# Patient Record
Sex: MALE | Race: WHITE | HISPANIC OR LATINO | Employment: FULL TIME | URBAN - METROPOLITAN AREA
[De-identification: names, ages, dates, MRNs, and addresses within clinical notes are randomized per-mention and may not be internally consistent; named-entity substitution may affect disease eponyms.]

---
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
## 2020-03-17 ENCOUNTER — OFFICE VISIT (OUTPATIENT)
Dept: PRIMARY CARE CLINIC | Facility: CLINIC | Age: 20
End: 2020-03-17
Payer: COMMERCIAL

## 2020-03-17 ENCOUNTER — PATIENT MESSAGE (OUTPATIENT)
Dept: PRIMARY CARE CLINIC | Facility: CLINIC | Age: 20
End: 2020-03-17

## 2020-03-17 DIAGNOSIS — R68.89 FLU-LIKE SYMPTOMS: Primary | ICD-10-CM

## 2020-03-17 DIAGNOSIS — R68.89 FLU-LIKE SYMPTOMS: ICD-10-CM

## 2020-03-17 DIAGNOSIS — J06.9 UPPER RESPIRATORY TRACT INFECTION, UNSPECIFIED TYPE: Primary | ICD-10-CM

## 2020-03-17 PROCEDURE — 99203 OFFICE O/P NEW LOW 30 MIN: CPT | Mod: 95,,, | Performed by: FAMILY MEDICINE

## 2020-03-17 PROCEDURE — 99203 PR OFFICE/OUTPT VISIT, NEW, LEVL III, 30-44 MIN: ICD-10-PCS | Mod: 95,,, | Performed by: FAMILY MEDICINE

## 2020-03-17 RX ORDER — GUAIFENESIN/DEXTROMETHORPHAN 100-10MG/5
5 SYRUP ORAL EVERY 6 HOURS
Qty: 236 ML | Refills: 0 | COMMUNITY
Start: 2020-03-17 | End: 2020-03-27

## 2020-03-17 NOTE — PROGRESS NOTES
Subjective:       Patient ID: Levar Bro is a 19 y.o. male.    Chief Complaint: No chief complaint on file.    The patient location is: home  The chief complaint leading to consultation is: flu like symptoms  Visit type: Virtual visit with synchronous audio and video  Total time spent with patient: 15 min  Each patient to whom he or she provides medical services by telemedicine is:  (1) informed of the relationship between the physician and patient and the respective role of any other health care provider with respect to management of the patient; and (2) notified that he or she may decline to receive medical services by telemedicine and may withdraw from such care at any time.    Notes: as below      19 yr old with no sig past med hx complaining of cough, sore throat, fever, chills for the past 4 days. His sore throat has mostly resolved but chills and subjective fever off and on still. Symptoms started 4 days ago. No known sick contacts. Lives with parents and works at [employer].          Review of Systems   Constitutional: Positive for activity change, chills and fever.   HENT: Positive for rhinorrhea and sore throat. Negative for congestion.    Respiratory: Positive for cough. Negative for shortness of breath and wheezing.    Cardiovascular: Negative for chest pain.   Gastrointestinal: Negative for abdominal distention and abdominal pain.   Neurological: Negative for seizures.   Psychiatric/Behavioral: Negative for agitation. The patient is not nervous/anxious.        Objective:      There were no vitals filed for this visit.  Physical Exam   Constitutional: He is oriented to person, place, and time. He appears well-developed and well-nourished. He appears ill. No distress.   HENT:   Head: Normocephalic.   Eyes: EOM are normal.   Pulmonary/Chest: Effort normal. No respiratory distress.   Neurological: He is alert and oriented to person, place, and time.   Psychiatric: He has a normal mood and affect.            No results found for: NA, K, CL, CO2, BUN, CREATININE, GLUCOSE, ANIONGAP  No results found for: HGBA1C  No results found for: BNP, BNPTRIAGEBLO    No results found for: WBC, HGB, HCT, PLT, GRAN  No results found for: CHOL, HDL, LDLCALC, TRIG     No current outpatient medications on file.        Assessment:       1. Flu-like symptoms           Plan:       Flu-like symptoms  Flu like symptoms for the past 4 days with on known sick contacts. Encouraged self quarantine, hydration and rest. No SOB. To keep writer updated on symptoms. Would defer return to work with symptoms resolved >24 hours at least. Does not meet criteria for corona virus testing. Sending note through portal for employer.

## 2021-04-16 ENCOUNTER — PATIENT MESSAGE (OUTPATIENT)
Dept: RESEARCH | Facility: HOSPITAL | Age: 21
End: 2021-04-16

## 2022-03-23 ENCOUNTER — OFFICE VISIT (OUTPATIENT)
Dept: PRIMARY CARE CLINIC | Facility: CLINIC | Age: 22
End: 2022-03-23
Payer: MEDICAID

## 2022-03-23 VITALS
SYSTOLIC BLOOD PRESSURE: 136 MMHG | DIASTOLIC BLOOD PRESSURE: 78 MMHG | HEART RATE: 68 BPM | WEIGHT: 150.69 LBS | HEIGHT: 68 IN | OXYGEN SATURATION: 98 % | RESPIRATION RATE: 18 BRPM | BODY MASS INDEX: 22.84 KG/M2

## 2022-03-23 DIAGNOSIS — Z11.4 ENCOUNTER FOR SCREENING FOR HIV: ICD-10-CM

## 2022-03-23 DIAGNOSIS — F41.9 ANXIETY: ICD-10-CM

## 2022-03-23 DIAGNOSIS — Z23 NEED FOR TD VACCINE: ICD-10-CM

## 2022-03-23 DIAGNOSIS — Z11.59 NEED FOR HEPATITIS C SCREENING TEST: ICD-10-CM

## 2022-03-23 DIAGNOSIS — Z13.220 SCREENING CHOLESTEROL LEVEL: ICD-10-CM

## 2022-03-23 DIAGNOSIS — R07.89 ATYPICAL CHEST PAIN: ICD-10-CM

## 2022-03-23 DIAGNOSIS — R07.9 CHEST PAIN, UNSPECIFIED TYPE: Primary | ICD-10-CM

## 2022-03-23 PROCEDURE — 93010 ELECTROCARDIOGRAM REPORT: CPT | Mod: S$PBB,,, | Performed by: INTERNAL MEDICINE

## 2022-03-23 PROCEDURE — 3078F PR MOST RECENT DIASTOLIC BLOOD PRESSURE < 80 MM HG: ICD-10-PCS | Mod: CPTII,,, | Performed by: FAMILY MEDICINE

## 2022-03-23 PROCEDURE — 93010 EKG 12-LEAD: ICD-10-PCS | Mod: S$PBB,,, | Performed by: INTERNAL MEDICINE

## 2022-03-23 PROCEDURE — 99999 PR PBB SHADOW E&M-EST. PATIENT-LVL III: CPT | Mod: PBBFAC,,, | Performed by: FAMILY MEDICINE

## 2022-03-23 PROCEDURE — 3075F SYST BP GE 130 - 139MM HG: CPT | Mod: CPTII,,, | Performed by: FAMILY MEDICINE

## 2022-03-23 PROCEDURE — 99999 PR PBB SHADOW E&M-EST. PATIENT-LVL III: ICD-10-PCS | Mod: PBBFAC,,, | Performed by: FAMILY MEDICINE

## 2022-03-23 PROCEDURE — 90714 TD VACC NO PRESV 7 YRS+ IM: CPT | Mod: PBBFAC,PN

## 2022-03-23 PROCEDURE — 99395 PREV VISIT EST AGE 18-39: CPT | Mod: S$PBB,,, | Performed by: FAMILY MEDICINE

## 2022-03-23 PROCEDURE — 3008F PR BODY MASS INDEX (BMI) DOCUMENTED: ICD-10-PCS | Mod: CPTII,,, | Performed by: FAMILY MEDICINE

## 2022-03-23 PROCEDURE — 99395 PR PREVENTIVE VISIT,EST,18-39: ICD-10-PCS | Mod: S$PBB,,, | Performed by: FAMILY MEDICINE

## 2022-03-23 PROCEDURE — 93005 ELECTROCARDIOGRAM TRACING: CPT | Mod: PBBFAC,PN | Performed by: INTERNAL MEDICINE

## 2022-03-23 PROCEDURE — 3075F PR MOST RECENT SYSTOLIC BLOOD PRESS GE 130-139MM HG: ICD-10-PCS | Mod: CPTII,,, | Performed by: FAMILY MEDICINE

## 2022-03-23 PROCEDURE — 99213 OFFICE O/P EST LOW 20 MIN: CPT | Mod: PBBFAC,PN | Performed by: FAMILY MEDICINE

## 2022-03-23 PROCEDURE — 3078F DIAST BP <80 MM HG: CPT | Mod: CPTII,,, | Performed by: FAMILY MEDICINE

## 2022-03-23 PROCEDURE — 1159F PR MEDICATION LIST DOCUMENTED IN MEDICAL RECORD: ICD-10-PCS | Mod: CPTII,,, | Performed by: FAMILY MEDICINE

## 2022-03-23 PROCEDURE — 1159F MED LIST DOCD IN RCRD: CPT | Mod: CPTII,,, | Performed by: FAMILY MEDICINE

## 2022-03-23 PROCEDURE — 3008F BODY MASS INDEX DOCD: CPT | Mod: CPTII,,, | Performed by: FAMILY MEDICINE

## 2022-03-23 RX ORDER — CITALOPRAM 10 MG/1
10 TABLET ORAL DAILY
Qty: 30 TABLET | Refills: 11 | Status: SHIPPED | OUTPATIENT
Start: 2022-03-23 | End: 2023-03-23

## 2022-03-23 NOTE — PROGRESS NOTES
Subjective:       Patient ID: Levar Bro is a 21 y.o. male.    Chief Complaint: No chief complaint on file.    HPI:  21-year-old white male in for chest pain for 2 weeks --started between shoulder and chest---started working its way medially and then went away--only happened when patient stretch doubt really bad.  Has and felt it anymore last few day.  Had no trauma no excessive activity--no lupus rheumatoid gout.  Did have some discomfort with moving the left arm--does work out --pain worse than usual muscle--father with heart problems started age 52 --had stent .       No ankle edema palpitations MI heart murmur hypertension or hyperlipidemia--no stent bypass. --CT chest pain above      Last week patient had some diarrhea--no nausea vomiting constipation ulcers hepatitis gallbladder--no melena hematochezia hematemesis      + anxiety and +depression----father just  last 2021--diagnosed with ALS and he  .  After seen in the office told disease advanced too much and father was placed hospital--in school--just quit job 2 weeks ago --due stress. Mother stresses pt out. Has girlfriend but doing--mother with hypertension.     ROS:  Skin: no psoriasis, eczema, skin cancer  HEENT: No headache, ocular pain, blurred vision, diplopia, epistaxis, hoarseness change in voice, thyroid trouble  Lung: No pneumonia, asthma, Tb, wheezing, SOB,  Heart: + chest pain, no  ankle chandni no smoking a, palpitations, MI, jillian murmur, hypertension, hyperlipidemia  Abdomen: No nausea, vomiting,+ hx  diarrhea, constipation, ulcers, hepatitis, gallbladder disease, melena, hematochezia, hematemesis  : no UTI, renal disease, stones  MS: no fractures, O/A, lupus, rheumatoid, gout  Neuro: No dizziness, LOC, seizures   No diabetes, no anemia, + anxiety, + depression   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 Chest pain, unspecified type    2. Screening cholesterol level    3. Encounter for screening for HIV    4. Need for hepatitis C screening test    5. Need for Td vaccine    6. Anxiety    7. Atypical chest pain        Plan:       Chest pain, unspecified type  -     IN OFFICE EKG 12-LEAD (to [facility])    Screening cholesterol level  -     Lipid Panel; Future; Expected date: 2022    Encounter for screening for HIV  -     HIV 1/2 Ag/Ab (4th Gen); Future; Expected date: 2022    Need for hepatitis C screening test  -     Hepatitis C Antibody; Future; Expected date: 2022    Need for Td vaccine  -     (In Office Administered) Td Vaccine - Preservative Free    Anxiety  -     Ambulatory referral/consult to Psychology; Future; Expected date: 2022  -     CBC Auto Differential; Future; Expected date: 2022  -     Comprehensive Metabolic Panel; Future; Expected date: 2022  -     TSH; Future; Expected date: 2022    Atypical chest pain    Other orders  -     citalopram (CELEXA) 10 MG tablet; Take 1 tablet (10 mg total) by mouth once daily.  Dispense: 30 tablet; Refill: 11        Main Reason for Visit  Atypical chest pain--left shoulder and left lateral breast area--only for few days none the last few day--EKG normal--patient concern father had heart issues--lots of stress  Anxiety--father  2021 ALS--when discovered with so for long patient's dad was just placed in hospice--patient lives with mother has some issues--in school studying  computer--has girlfriend--will treat with Celexa 10 q.d. help with anxiety--may have to add lorazepam if needed but would be better for patient to see psychologist--develop some coping skills--patient should exercise--take yoga class--go to [employer]  Told to make a Wish bucket-make a scrap book

## 2022-03-24 ENCOUNTER — TELEPHONE (OUTPATIENT)
Dept: PSYCHOLOGY | Facility: CLINIC | Age: 22
End: 2022-03-24
Payer: MEDICAID

## 2022-03-24 ENCOUNTER — TELEPHONE (OUTPATIENT)
Dept: DIABETES | Facility: CLINIC | Age: 22
End: 2022-03-24
Payer: MEDICAID

## 2022-03-24 ENCOUNTER — PATIENT MESSAGE (OUTPATIENT)
Dept: DIABETES | Facility: CLINIC | Age: 22
End: 2022-03-24
Payer: MEDICAID

## 2022-03-24 NOTE — TELEPHONE ENCOUNTER
----- Message from Kelly Mcmanus RN sent at 3/24/2022  3:53 PM CDT -----  Regarding: FW: appt  Contact: pt @ [phone]    ----- Message -----  From: Yady Rice  Sent: 3/24/2022   3:16 PM CDT  To: Ivanna Aguero LPN, #  Subject: appt                                             Pt returning missed call from Psychology (Ivanna), patient says 3/31 @10am would be ok with him for the appt.  please call.

## 2022-03-24 NOTE — TELEPHONE ENCOUNTER
----- Message from Alycia Ayon LCSW sent at 3/24/2022  2:03 PM CDT -----    ----- Message -----  From: Kyleigh Turner  Sent: 3/24/2022   1:10 PM CDT  To: Alycia Ayon LCSW     Anxiety referral in please call patient back to schedule

## 2022-09-27 ENCOUNTER — PATIENT MESSAGE (OUTPATIENT)
Dept: PRIMARY CARE CLINIC | Facility: CLINIC | Age: 22
End: 2022-09-27
Payer: MEDICAID

## 2023-03-20 ENCOUNTER — OFFICE VISIT (OUTPATIENT)
Dept: PRIMARY CARE CLINIC | Facility: CLINIC | Age: 23
End: 2023-03-20
Payer: MEDICAID

## 2023-03-20 VITALS
DIASTOLIC BLOOD PRESSURE: 88 MMHG | WEIGHT: 198.75 LBS | OXYGEN SATURATION: 98 % | TEMPERATURE: 98 F | HEIGHT: 68 IN | BODY MASS INDEX: 30.12 KG/M2 | SYSTOLIC BLOOD PRESSURE: 130 MMHG | HEART RATE: 82 BPM | RESPIRATION RATE: 18 BRPM

## 2023-03-20 DIAGNOSIS — F41.9 ANXIETY: Primary | ICD-10-CM

## 2023-03-20 DIAGNOSIS — R03.0 BORDERLINE HYPERTENSION: ICD-10-CM

## 2023-03-20 PROBLEM — R07.89 ATYPICAL CHEST PAIN: Status: RESOLVED | Noted: 2022-03-23 | Resolved: 2023-03-20

## 2023-03-20 PROCEDURE — 3075F SYST BP GE 130 - 139MM HG: CPT | Mod: CPTII,,, | Performed by: FAMILY MEDICINE

## 2023-03-20 PROCEDURE — 3008F PR BODY MASS INDEX (BMI) DOCUMENTED: ICD-10-PCS | Mod: CPTII,,, | Performed by: FAMILY MEDICINE

## 2023-03-20 PROCEDURE — 3080F DIAST BP >= 90 MM HG: CPT | Mod: CPTII,,, | Performed by: FAMILY MEDICINE

## 2023-03-20 PROCEDURE — 99999 PR PBB SHADOW E&M-EST. PATIENT-LVL III: CPT | Mod: PBBFAC,,, | Performed by: FAMILY MEDICINE

## 2023-03-20 PROCEDURE — 3080F PR MOST RECENT DIASTOLIC BLOOD PRESSURE >= 90 MM HG: ICD-10-PCS | Mod: CPTII,,, | Performed by: FAMILY MEDICINE

## 2023-03-20 PROCEDURE — 99214 OFFICE O/P EST MOD 30 MIN: CPT | Mod: S$PBB,,, | Performed by: FAMILY MEDICINE

## 2023-03-20 PROCEDURE — 99999 PR PBB SHADOW E&M-EST. PATIENT-LVL III: ICD-10-PCS | Mod: PBBFAC,,, | Performed by: FAMILY MEDICINE

## 2023-03-20 PROCEDURE — 99213 OFFICE O/P EST LOW 20 MIN: CPT | Mod: PBBFAC,PN | Performed by: FAMILY MEDICINE

## 2023-03-20 PROCEDURE — 3075F PR MOST RECENT SYSTOLIC BLOOD PRESS GE 130-139MM HG: ICD-10-PCS | Mod: CPTII,,, | Performed by: FAMILY MEDICINE

## 2023-03-20 PROCEDURE — 3008F BODY MASS INDEX DOCD: CPT | Mod: CPTII,,, | Performed by: FAMILY MEDICINE

## 2023-03-20 PROCEDURE — 99214 PR OFFICE/OUTPT VISIT, EST, LEVL IV, 30-39 MIN: ICD-10-PCS | Mod: S$PBB,,, | Performed by: FAMILY MEDICINE

## 2023-03-20 PROCEDURE — 1159F MED LIST DOCD IN RCRD: CPT | Mod: CPTII,,, | Performed by: FAMILY MEDICINE

## 2023-03-20 PROCEDURE — 1159F PR MEDICATION LIST DOCUMENTED IN MEDICAL RECORD: ICD-10-PCS | Mod: CPTII,,, | Performed by: FAMILY MEDICINE

## 2023-03-20 RX ORDER — LORAZEPAM 0.5 MG/1
TABLET ORAL
Qty: 30 TABLET | Refills: 2 | Status: SHIPPED | OUTPATIENT
Start: 2023-03-20

## 2023-03-20 NOTE — PROGRESS NOTES
Subjective:       Patient ID: Levar Bro is a 22 y.o. male.    Chief Complaint: check up    HPI:  22-year-old white male --in for checkup--eating well--+BM--ambulating well  Hx anxiety--was on celex --deprewssion resolved. Hx anxiety--day patient had appointment with counselor had appointment for new job some missed appoint  Anxiety --school computer networking--hard time focusing . Single no children . Lives with mother and girlfriend . Mother sick had cancer lung in remission.  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 Goes school lunch and after school and off weekends     ROS:  Skin: no psoriasis, eczema, skin cancer  HEENT: No headache, ocular pain, blurred vision, diplopia, epistaxis, hoarseness change in voice, thyroid trouble  Lung: No pneumonia, asthma, Tb, wheezing, SOB, no smoking  Heart: no chest pain, no  ankle chandni no smoking a, palpitations, MI, jillian murmur, hypertension, hyperlipidemia--blood pressure high today 128/98  Abdomen: No nausea, vomiting,no  diarrhea, constipation, ulcers, hepatitis, gallbladder disease, melena, hematochezia, hematemesis  : no UTI, renal disease, stones  MS: no fractures, O/A, lupus, rheumatoid, gout  Neuro: No dizziness, LOC, seizures   No diabetes, no anemia, + anxiety, no  depression   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 Anxiety    2. Borderline hypertension           Plan:       Anxiety  -     CBC Auto Differential; Future; Expected date: 2023  -     Comprehensive Metabolic Panel; Future; Expected date: 2023  -     Lipid Panel; Future; Expected date: 2023  -     TSH; Future; Expected date: 2023  -     Ambulatory referral/consult to Psychology; Future; Expected date: 2023    Borderline hypertension    Other orders  -     LORazepam (ATIVAN) 0.5 MG tablet; One p.o. q.d. p.r.n. anxiety do not take daily only p.r.n. anxiety  Dispense: 30 tablet; Refill: 2          Main Reason for Visit  Anxiety--father  2021 ALS--when discovered with so for long patient's dad was just placed in hospice--patient lives with mother had lung cancer in remission---in school studying computer--has girlfriend--working as technician with video machines --states problems fo was on celexa no longer feels depressed so stopped it--will give Ativan 0.51 p.o. q.d. p.r.n. anxiety--  Needs appointment with psychologist at does testing for ADD--if patient needs Adderall all be happy to write it--but needs to have a diagnosis of ADD 1st  ?  Hypertension blood pressure 130/88 repeat BP nlow-salt diet   Told to make a Wish bucket-make a scrap book  Lab CBCs CMP lipids TSH  Health maintenance HPV flu  Prominent 2nd heart sound if patient ends up with hypertension will get echocardiogram rule out mitral valve prolapse

## 2023-03-26 DIAGNOSIS — R79.89 ELEVATED LIVER FUNCTION TESTS: Primary | ICD-10-CM

## 2023-03-28 DIAGNOSIS — R79.89 ELEVATED LIVER FUNCTION TESTS: Primary | ICD-10-CM

## 2023-09-18 ENCOUNTER — PATIENT MESSAGE (OUTPATIENT)
Dept: PRIMARY CARE CLINIC | Facility: CLINIC | Age: 23
End: 2023-09-18
Payer: MEDICAID

## 2023-10-18 ENCOUNTER — PATIENT MESSAGE (OUTPATIENT)
Dept: CARDIOLOGY | Facility: CLINIC | Age: 23
End: 2023-10-18
Payer: MEDICAID